# Patient Record
Sex: MALE | Race: BLACK OR AFRICAN AMERICAN | NOT HISPANIC OR LATINO | Employment: UNEMPLOYED | URBAN - METROPOLITAN AREA
[De-identification: names, ages, dates, MRNs, and addresses within clinical notes are randomized per-mention and may not be internally consistent; named-entity substitution may affect disease eponyms.]

---

## 2024-11-25 ENCOUNTER — HOSPITAL ENCOUNTER (EMERGENCY)
Facility: MEDICAL CENTER | Age: 34
End: 2024-11-25
Attending: EMERGENCY MEDICINE

## 2024-11-25 VITALS
HEART RATE: 91 BPM | SYSTOLIC BLOOD PRESSURE: 120 MMHG | OXYGEN SATURATION: 96 % | DIASTOLIC BLOOD PRESSURE: 60 MMHG | WEIGHT: 185 LBS | TEMPERATURE: 98 F | BODY MASS INDEX: 22.53 KG/M2 | RESPIRATION RATE: 18 BRPM | HEIGHT: 76 IN

## 2024-11-25 DIAGNOSIS — F10.220 ALCOHOL DEPENDENCE WITH UNCOMPLICATED INTOXICATION (HCC): ICD-10-CM

## 2024-11-25 DIAGNOSIS — F10.920 ALCOHOLIC INTOXICATION WITHOUT COMPLICATION (HCC): ICD-10-CM

## 2024-11-25 PROCEDURE — 99284 EMERGENCY DEPT VISIT MOD MDM: CPT

## 2024-11-26 NOTE — DISCHARGE PLANNING
@ 17:20 - Facilitated 1:1 peer support to promote wellness, self care, and recovery. Modeled recovery, demonstrating hope, resilience, and self-determination in overcoming obstacles.  Utilizing personal lived experience to offer empathy, understanding, and encouragement to PT facing similar challenges. Educated PT in accessing community resources, , and other support networks to enhance their overall well-being and quality of life.     1) [facility] for emergency shelter  2) [facility] for [facility] and [facility] treatment and programs  3)[facility] resources to apply for medicaid and [facility] for additional resources.    PT communicative and recovery focused. PT easy to doze off while in conversation, but responsive to verbal cues.  PT mad no mention of SI/HI/SDV statements and was able to communicate needs and requests.  Bedside RN will provide PT with a bus pass and is preparing PT for imminent DC.

## 2024-11-26 NOTE — ED PROVIDER NOTES
"ED Provider Note    CHIEF COMPLAINT  Chief Complaint   Patient presents with    Alcohol Intoxication     BIB [facility] from motel. Drinks a fifth a day. Requesting for detox. Aox4. Last drink prior to calling EMS. Ambulatory with steady gait.  mg/dL.        EXTERNAL RECORDS REVIEWED      HPI/ROS  LIMITATION TO HISTORY     OUTSIDE HISTORIAN(S):      Troy Ricardo is a 34 y.o. male who presents requesting detox.  Patient reports that has been drinking for the last 20 years.  States that he has been drinking more liquor recently and needs help detoxing.  He reports that he also feels dehydrated.  No fevers no chills no cough no abdominal pain no other acute symptom change or concern.  Patient did drink just prior to arrival and does report that he still feels somewhat intoxicated.    PAST MEDICAL HISTORY     Alcohol dependence  SURGICAL HISTORY  patient denies any surgical history    FAMILY HISTORY  No family history on file.    SOCIAL HISTORY  Social History     Tobacco Use    Smoking status: Not on file    Smokeless tobacco: Not on file   Substance and Sexual Activity    Alcohol use: Not on file    Drug use: Not on file    Sexual activity: Not on file       CURRENT MEDICATIONS  Home Medications       Reviewed by Prince Santos R.N. (Registered Nurse) on 11/25/24 at 1618  Med List Status: Not Addressed     Medication Last Dose Status        Patient Mark Taking any Medications                           ALLERGIES  No Known Allergies    PHYSICAL EXAM  VITAL SIGNS: /84   Pulse 83   Temp 36.7 °C (98 °F) (Temporal)   Resp 18   Ht 1.93 m (6' 4\")   Wt 83.9 kg (185 lb)   SpO2 90%   BMI 22.52 kg/m²      Pulse ox interpretation: I interpret this pulse ox as normal.  Constitutional: Alert and oriented x 3, minimal distress  HEENT: Atraumatic normocephalic, pupils are equal round reactive to light extraocular movements are intact. The nares is clear, external ears are normal, mouth shows moist mucous membranes " "normal dentition for age  Neck: Supple, no JVD no tracheal deviation  Cardiovascular: Regular rate and rhythm no murmur rub or gallop 2+ pulses peripherally x4  Thorax & Lungs: No respiratory distress, no wheezes rales or rhonchi, No chest tenderness.   GI: Soft nontender nondistended positive bowel sounds, no peritoneal signs  Skin: Warm dry no acute rash or lesion  Musculoskeletal: Moving all extremities with full range and 5 of 5 strength no acute  deformity  Neurologic: Cranial nerves III through XII are grossly intact no sensory deficit no cerebellar dysfunction   Psychiatric: Appropriate affect for situation at this time      COURSE & MEDICAL DECISION MAKING    ASSESSMENT, COURSE AND PLAN  Care Narrative: Patient appears minimally intoxicated at this time however alert and oriented x 4 he is tolerating p.o. intake exam [place] without difficulty.  I have discussed the case with peer support team who will help assist getting patient appropriate resources to present to rehab.  Return for worsening symptom changes or concerns otherwise discharged in stable and improved condition.            ADDITIONAL PROBLEMS MANAGED      DISPOSITION AND DISCUSSIONS  I have discussed management of the patient with the following physicians and JANNET's:      Discussion of management with other QHP or appropriate source(s):      Escalation of care considered, and ultimately not performed:IV fluids considered however patient has normal vital signs and no acute sign of dehydration    Barriers to care at this time, including but not limited to: Patient does not have established PCP and Patient is homeless.     Decision tools and prescription drugs considered including, but not limited to: .  /67   Pulse 93   Temp 36.7 °C (98 °F) (Temporal)   Resp 18   Ht 1.93 m (6' 4\")   Wt 83.9 kg (185 lb)   SpO2 96%   BMI 22.52 kg/m²     Psychiatry Resources  As provided by [employer]  Schedule an appointment as soon as possible for a " visit       [place] [address] [facility] [address]  [phone]    for establishment of primary care, for blood pressure management    [facility], Emergency Dept  [address] [facility] [address] [phone]    in 12-24 hours if symptoms persist, immediately If symptoms worsen, or if you develop any other symptoms or concerns      FINAL DIAGNOSIS  1. Alcoholic intoxication without complication (HCC) Active   2. Alcohol dependence with uncomplicated intoxication (HCC)         Electronically signed by: Ford Henson M.D., 11/25/2024 4:25 PM

## 2024-11-26 NOTE — DISCHARGE PLANNING
"@ 16:42 - Collaborated with behavioral health and ED staff, social workers, and other service providers to develop comprehensive care plans that address the unique needs of the PT.      Needs Assessment: C/C is ETOH intoxication and PT reports at triage that he drinks \"a fifth a day.\" PT requesting for detox resources. PT reports last drink prior to calling EMS. PT reports to ERP that he \"moved to [place] a week ago\" and he reports being homeless and does not have insurance for rehab.      "

## 2024-11-26 NOTE — ED TRIAGE NOTES
Troy Ricardo  34 y.o. male  Chief Complaint   Patient presents with    Alcohol Intoxication     BIB [facility] from [facility]. Drinks a fifth a day. Requesting for detox. Aox4. Last drink prior to calling EMS. Ambulatory with steady gait.  mg/dL.      Moved to [place] a week ago reports being homeless and does not have insurance for rehab.     Pt is GCS 15, speaking in full sentences, follows commands and responds appropriately to questions. Resp are even and unlabored.     Vitals:    11/25/24 1615   BP: 125/84   Pulse: 83   Resp: 18   Temp: 36.7 °C (98 °F)   SpO2: 90%

## 2024-11-26 NOTE — ED NOTES
Patient provided discharge instructions. Patient verbalized understanding. Patient leaving ER in stable condition. Patient ambulatory with steady gait. Bus pass, sandwich and gatorade given.